# Patient Record
Sex: MALE | Race: WHITE | NOT HISPANIC OR LATINO | Employment: UNEMPLOYED | URBAN - METROPOLITAN AREA
[De-identification: names, ages, dates, MRNs, and addresses within clinical notes are randomized per-mention and may not be internally consistent; named-entity substitution may affect disease eponyms.]

---
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
## 2021-01-05 ENCOUNTER — TELEPHONE (OUTPATIENT)
Dept: OPHTHALMOLOGY | Facility: CLINIC | Age: 21
End: 2021-01-05

## 2021-01-05 NOTE — TELEPHONE ENCOUNTER
left voice mail, genetics counselor would like to see them at either 11:30 or 3:15.  Left my office number to return my call.  Osmin Arvizu

## 2021-01-11 ENCOUNTER — OFFICE VISIT (OUTPATIENT)
Dept: OPHTHALMOLOGY | Facility: CLINIC | Age: 21
End: 2021-01-11
Attending: OPHTHALMOLOGY
Payer: COMMERCIAL

## 2021-01-11 DIAGNOSIS — L56.8 PHOTOSENSITIVITY: Primary | ICD-10-CM

## 2021-01-11 DIAGNOSIS — H21.563 PUPILLARY ABNORMALITY OF BOTH EYES: ICD-10-CM

## 2021-01-11 DIAGNOSIS — Q14.1 CONGENITAL HYPOPLASIA OF FOVEA CENTRALIS: ICD-10-CM

## 2021-01-11 DIAGNOSIS — H53.50: ICD-10-CM

## 2021-01-11 DIAGNOSIS — H53.51 ACHROMATOPSIA, CONGENITAL: ICD-10-CM

## 2021-01-11 DIAGNOSIS — H35.53 CONE DYSTROPHY: Primary | ICD-10-CM

## 2021-01-11 DIAGNOSIS — H35.53 CONE DYSTROPHY: ICD-10-CM

## 2021-01-11 DIAGNOSIS — H55.01 CONGENITAL NYSTAGMUS: ICD-10-CM

## 2021-01-11 DIAGNOSIS — Z71.83 ENCOUNTER FOR NONPROCREATIVE GENETIC COUNSELING: ICD-10-CM

## 2021-01-11 DIAGNOSIS — L56.8 PHOTOSENSITIVITY: ICD-10-CM

## 2021-01-11 PROCEDURE — 92250 FUNDUS PHOTOGRAPHY W/I&R: CPT | Performed by: OPHTHALMOLOGY

## 2021-01-11 PROCEDURE — 99214 OFFICE O/P EST MOD 30 MIN: CPT | Performed by: OPHTHALMOLOGY

## 2021-01-11 PROCEDURE — 96040 HC GENETIC COUNSELING, EACH 30 MINUTES: CPT | Performed by: GENETIC COUNSELOR, MS

## 2021-01-11 PROCEDURE — 999N001104 HC STATISTIC DNA ISOL HIGH PURITY: Performed by: OPHTHALMOLOGY

## 2021-01-11 PROCEDURE — 92134 CPTRZ OPH DX IMG PST SGM RTA: CPT | Performed by: OPHTHALMOLOGY

## 2021-01-11 PROCEDURE — 99207 FUNDUS AUTOFLUORESCENCE IMAGE (FAF) OU (BOTH EYES): CPT | Mod: 26 | Performed by: OPHTHALMOLOGY

## 2021-01-11 PROCEDURE — G0463 HOSPITAL OUTPT CLINIC VISIT: HCPCS

## 2021-01-11 PROCEDURE — 92015 DETERMINE REFRACTIVE STATE: CPT

## 2021-01-11 RX ORDER — EPINEPHRINE 0.3 MG/.3ML
0.3 INJECTION SUBCUTANEOUS
COMMUNITY
Start: 2020-02-06

## 2021-01-11 ASSESSMENT — REFRACTION_WEARINGRX
OD_ADD: +5.00
OD_CYLINDER: SPHERE
OS_AXIS: 073
SPECS_TYPE: BIFOCAL
OD_SPHERE: +1.50
OS_ADD: +5.00
OS_CYLINDER: +2.50
OS_SPHERE: +2.25

## 2021-01-11 ASSESSMENT — CUP TO DISC RATIO
OD_RATIO: 0.3
OS_RATIO: 0.6

## 2021-01-11 ASSESSMENT — VISUAL ACUITY
CORRECTION_TYPE: GLASSES
OS_CC: 20/150
METHOD: SNELLEN - LINEAR
OD_CC: 20/100

## 2021-01-11 ASSESSMENT — TONOMETRY
OD_IOP_MMHG: 10
IOP_METHOD: TONOPEN
OS_IOP_MMHG: 14

## 2021-01-11 ASSESSMENT — REFRACTION_MANIFEST
OD_ADD: +5.00
OS_ADD: +5.00
OS_SPHERE: +2.25
OD_CYLINDER: SPHERE
OS_CYLINDER: +2.50
OD_SPHERE: +1.25
OS_AXIS: 083

## 2021-01-11 ASSESSMENT — CONF VISUAL FIELD
OS_NORMAL: 1
METHOD: COUNTING FINGERS
OD_NORMAL: 1

## 2021-01-11 ASSESSMENT — SLIT LAMP EXAM - LIDS
COMMENTS: NORMAL
COMMENTS: NORMAL

## 2021-01-11 NOTE — PROGRESS NOTES
CC: retina dystrophy consult  HPI: Stanislav Calderon is a  20 year old year-old patient referred by Dr. Enciso to rule out cone dystrophy vs achromatopsia.  Patient has history of light sensitivity/photophobia.  Negative for glare and eye pain.  Treatments tried include glasses.  Reports Vision stable with prescription for the past several yrs. Sits near front of classroom, has lights dimmed, teacher uses specific colors on white board and says what he is writing. Photophobia controlled with transition lenses.  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 Associated with reduced vision, photosensitivity, abnormal color vision, nystagmus, and paradoxical pupil.  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   Characteristic of retinal dystrophy- achromatopsia    Congenital nystagmus - Both Eyes  Fine amplitude. Related to cone dystrophy.    Monocular esotropia - Left Eye  Small angle esotropia with great cosmesis. Is s/p Arian with Dr. Faye for anomalous head posture.  - Monitor by Dr. Enciso.    Low vision, both eyes  R:  20/80+1; L:20/125+1 best corrected visual acuity   - Continue with Transitions lenses/cap.    Abnormally colored vision - Both Eyes  Ishihara test could only identified the control plate.   Related to cone dystrophy.     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 Will monitor.    Hyperopia right eye   Hyperopic astigmatism left eye   - Updated glasses  prescription provided last eye examination. Likes high add for near.  - happy with the current prescription     Plan:  - will follow up genetic testing  - recommend follow up retina evaluation with FFERG and goldmann visual field (GVF) and macula Optical Coherence Tomography in 1 yr to assess for progression  Sooner as needed     ~~~~~~~~~~~~~~~~~~~~~~~~~~~~~~~~~~   Complete documentation of historical and exam elements from today's encounter can be found in the full encounter summary report (not reduplicated in this progress note).  I personally obtained the chief complaint(s) and history of present illness.  I confirmed and edited as necessary the review of systems, past medical/surgical history, family history, social history, and examination findings as documented by others; and I examined the patient myself.  I personally reviewed the relevant tests, images, and reports as documented above.  I formulated and edited as necessary the assessment and plan and discussed the findings and management plan with the patient and family    Molly Bran MD   of Ophthalmology.  Retina Service   Department of Ophthalmology and Visual Neurosciences   [facility]  Phone: [phone]   Fax: [phone]

## 2021-01-11 NOTE — LETTER
1/11/2021       RE: Stanislav Calderon  [address]  [place] [address]     Dear Colleague,    Thank you for referring your patient, Stanislav Calderon, to the [facility] at [facility]. Please see a copy of my visit note below.       CC: retina dystrophy consult  HPI: Stanislav Calderon is a  20 year old year-old patient referred by Dr. Enciso to rule out cone dystrophy vs achromatopsia.  Patient has history of light sensitivity/photophobia.  Negative for glare and eye pain.  Treatments tried include glasses.  Reports Vision stable with prescription for the past several yrs. Sits near front of classroom, has lights dimmed, teacher uses specific colors on white board and says what he is writing. Photophobia controlled with transition lenses.  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 Associated with reduced vision, photosensitivity, abnormal color vision, nystagmus, and paradoxical pupil.  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   Characteristic of retinal dystrophy- achromatopsia    Congenital nystagmus - Both Eyes  Fine amplitude. Related to cone dystrophy.    Monocular esotropia - Left Eye  Small angle esotropia with great cosmesis. Is s/p Arian with Dr. Faye for anomalous head posture.  - Monitor by Dr. Enciso.    Low vision, both eyes  R:  20/80+1; L:20/125+1 best corrected visual acuity   - Continue with Transitions  lenses/cap.    Abnormally colored vision - Both Eyes  Ishihara test could only identified the control plate.   Related to cone dystrophy.     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 Will monitor.    Hyperopia right eye   Hyperopic astigmatism left eye   - Updated glasses prescription provided last eye examination. Likes high add for near.  - happy with the current prescription     Plan:  - will follow up genetic testing  - recommend follow up retina evaluation with FFERG and goldmann visual field (GVF) and macula Optical Coherence Tomography in 1 yr to assess for progression  Sooner as needed     ~~~~~~~~~~~~~~~~~~~~~~~~~~~~~~~~~~   Complete documentation of historical and exam elements from today's encounter can be found in the full encounter summary report (not reduplicated in this progress note).  I personally obtained the chief complaint(s) and history of present illness.  I confirmed and edited as necessary the review of systems, past medical/surgical history, family history, social history, and examination findings as documented by others; and I examined the patient myself.  I personally reviewed the relevant tests, images, and reports as documented above.  I formulated and edited as necessary the assessment and plan and discussed the findings and management plan with the patient and family. Molly Bran MD    Again, thank you for allowing me to participate in the care of your patient.      Sincerely,  Molly Bran M.D.   of Ophthalmology  Vitreoretinal Surgeon  [facility] Endowed Chair  Department of Ophthalmology & Visual Neurosciences  [facility]  Phone:  [phone]   Fax:  [phone]

## 2021-01-11 NOTE — NURSING NOTE
Chief Complaint(s) and History of Present Illness(es)     Cone Shyam Dystrophy Follow Up     In both eyes.  Associated symptoms include Negative for dryness, eye pain, redness and tearing.  Pain was noted as 0/10.              Comments     Pt is here for his yearly eye exam. Pt notes vision has been about the same x the last year.     Ocular meds: none    CHRISSY Hannah 12:49 PM January 11, 2021

## 2021-01-12 LAB — COPATH REPORT: NORMAL

## 2021-01-14 NOTE — PROGRESS NOTES
"[facility] Genetic Counseling Note     Date of Visit: 01/11/21     Presenting Information: Stanislav Calderon is a 20 year old year old male who was seen along with with his mother and brother for genetic counseling at the request of Dr. Bran, because Stanislav's previous eye exam findings were suggestive a diagnosis of possible cone dystrophy or achromatopsia.  Genetic counseling was requested to obtain family history, to discuss the genetic details of retinal disorders, and to coordinate genetic testing.       Medical History:   Stanislav has a history of photophobia, congenital nystagmus, foveal hypoplasia, paradoxical pupil, and abnormal color vision described as \"muted.\" Stanislav's identical twin brother, Chicho, has the same presentation. Both boys were previously followed by Dr. Davis. At one point there was a question of oculocutaneous albinism due to their eye findings and light pigmented skin and hair. They were most recently seen by Dr. Enciso in 11/2019 with the differential diagnoses being cone dystrophy vs achromatopsia. Genetic testing has been a consideration in the past but has not been pursued.     Stanislav reports no other health concerns. Please refer to Dr. Bran's not from today for further detail of today's exam findings.     Birth History:  Stanislav and his twin brother were born 5 weeks early. The pregnancy was complicated by twin-twin transfusion.      Family History:  A three generation pedigree was obtained and scanned into the electronic medical record. The relevant portions are described below:       Siblings-     Identical twin brother, Chicho, who has the same eye condition.     17 year old brother who is healthy    15 year old brother who is healthy    Parents- Rancho's mother is 48 years old and is healthy. His father is 48 years old and is healthy.     Maternal Relatives- Stanislav has one maternal aunt who is healthy and has a healthy 22 year old daughter. Stanislav has one maternal " aunt who is healthy and has a healthy son and a daughter with diabetes, presumably type 1. Stanislav has one maternal uncle who is healthy and has a healthy son and daughter. Stanislav's maternal grandmother is 75 years old and is reported to have kidney problems attributed to having scarlet fever as a child. She has 13 siblings who have no reported vision concerns. Stanislav's maternal grandfather is 75 years old and had glaucoma diagnosed at age 60.     Paternal Relatives- Stanislav has one paternal aunt who has Sjogren's and she has one son who is healthy and one daughter who has a learning disability. Stanislav has one paternal aunt who is healthy and has three children, two daughters and one son. One of her daughters is 10 and is reported to have anxiety and other mental health concerns. Stanislav's paternal grandmother is alive and well. His paternal grandfather had skin cancer on his face and is otherwise healthy.      Family history is otherwise largely non-contributory. Maternal ancestry is [place] and paternal ancestry is [place]. Consanguinity was denied.      Genetic Counseling Discussion:  We reviewed that our bodies are made of cells that contain our chromosomes which are made up of long stretches of DNA containing our genes. Our genes serve as the instructions for our bodies to grow and function. We have two copies of each gene, one inherited from our mother and one inherited from our father.     Retinal dystrophy is a broad category of eye conditions that are all associated with breakdown or degeneration of the retina.  Depending on the specific condition, this degeneration can affect the various different cells types of the retina, and therefore, the specific symptoms can vary significantly from one retinal dystrophy condition to another.  Most commonly affected areas of the retina include the photoreceptors (rods and cones) or the retinal pigment epithelium (RPE). Cone dystrophy is a specific type of retinal dystrophy  that affects the cones of the retina. The cones allow us to see color and fine detail so individuals with cone dystrophies usually have decreased visual acuity, a reduced ability to see colors, and an increased sensitivity to light (photophobia). The age of onset of symptoms, severity of symptoms, and progression of symptoms vary between individuals with cone dystrophies and can vary even between members of the same family with the same type of cone dystrophy. To date, mutations, or changes, in many different genes have been found to cause cone dystrophy.     Achromatopsia is caused by problems with the cones's ability to react appropriately to light. It is characterized by partial or total absence of color vision. Individuals with complete achromatopsia cannot perceive any colors and see only black, white, and shades of gray. Incomplete achromatopsia is a milder form of the condition that allows some color discrimination. Individuals with achromatopsia can develop other eye symptoms in the first few months of life such as photophobia, nystagmus, low visual acuity, and near- or farsightedness. Mutations in one of several genes (ATF6, CNGA3, CNGB3, GNAT2, PDE6C, or PDE6H) are known to cause achromatopsia.    Cone dystrophies and achromatopsia can be inherited in a variety of different inheritance patterns depending on which gene is associated with the condition. They can be inherited in an autosomal recessive inheritance pattern, meaning both copies of the gene have a mutation causing the disorder. Cone dystrophy can also be inherited in an X-linked inheritance pattern meaning a mutation in a gene on the X chromosome causes the condition. Typically males are more severely affected with X-linked conditions, but carrier women can have symptoms in some X-linked disorders. Cone dystrophy can also be inherited in an autosomal dominant inheritance pattern, meaning a mutation in one copy of the gene is sufficient to cause  the condition.The absence of a family history cannot exclude dominant inheritance due to the frequency of new (de derik) mutations and variable penetrance.    We reviewed the availability of genetic testing via Next Generation Sequencing (NGS) for analysis of genes known to be associated with cone dystrophy, achromatopsia, and oculocutaneous albinism, with the aim of determining what condition is causing Stanislav and his twin brother's eye symptoms. This custom panel will encompass the genes for all of the differential diagnoses Cyrus have had. NGS allows a genetic testing laboratory to rapidly sequence DNA so that the testing laboratory can read, chemical base by chemical base, through a gene looking for changes in the instructions of the genes that might alter the function of that gene or the protein made from that gene. NGS has the benefit of allowing for a panel-based approach of sequencing multiple genes at once, rather than sequencing each gene individually in a stepwise fashion; this conserves both cost and time.     We went on to discuss the details, limitations, and possible outcomes of NGS. In particular, we discussed that there are three possible results from NGS:    Negative: meaning normal or no mutations are identified in the genes that were tested/sequenced    Positive: meaning a mutation that is known to be associated with a particular set of symptoms is identified    Variant of uncertain significance (VUS): meaning a change in the DNA sequence of a particular gene was seen but there is not enough information or data yet to know if it explains the symptoms. If a VUS is identified, testing of other relatives may be helpful to provide clarification.  In most cases, identification of a VUS does not confirm a diagnosis and does not result in any clinically actionable recommendations.    We discussed the potential benefits of genetic testing and why this genetic testing is medically indicated. A  positive result will help determine the etiology of the ocular abnormalities noted in Cyrus and may guide the medical management for them, particularly if a syndromic cause of these ocular symptoms is found.  Additionally, for many of these genes, there is information available about expected prognosis or new treatment options.  Also, if a genetic cause is found for Cyrus's ocular abnormalities, it will give us a more accurate risk assessment for other family members, particularly Cyrus's future children.     We also discussed the possibility that this test could turn up no definitive answers about the underlying cause of the boys' ocular abnormalties.  We talked about that a negative genetic test would not rule out a genetic cause for thier ocular abnormalities, as it is likely that not all the genes associated with inherited retinal disorders are currently known.       We discussed the most cost and time efficient method of testing is to start with testing for one affected family member, in this case either Stanislav or Chicho. If testing is positive, we can then test the other brother for the specific familial variant. Stanislav elected to undergo genetic testing today.     Next Generation Sequencing is a well established technology utilized by all molecular genetic labs throughout the country for identifying disease-causing mutations in various genes.  Next Generation Sequencing is currently the standard of care for genetic testing of single genes.  The recommended testing for Stanislav  is DIAGNOSTIC testing, and it is NOT investigational.    Stanislav consented for genetic testing today. We will submit information for prior authorization and Stanislav will be contacted regarding the authorization status and potential cost of testing. If we proceed with testing, I will call with the results in 4-6 weeks.     It was a pleasure meeting Chicho [place], and their mother today. They were  encouraged to reach out to me if they have any further questions.     Plan:  1. Custom panel at [facility] for genes associated with cone-samantha dystrophy, achromatopsia, and oculocutaneous albinism  2. Information will be submitted for insurance prior authorization. Stanislav will be contacted with the outcome in 1-2 week. If he proceeds with testing, I will call him with results in 4-6 weeks.     Lita Block MS, [facility]  Licensed Genetic Counselor  [facility]  Phone: [phone]  Fax: [phone]    Time spent in consultation face to face was approximately 45 minutes.

## 2021-02-19 ENCOUNTER — TELEPHONE (OUTPATIENT)
Dept: CONSULT | Facility: CLINIC | Age: 21
End: 2021-02-19

## 2021-02-19 NOTE — TELEPHONE ENCOUNTER
Notified Trey, patient's father, that we received prior authorization approval for Stanislav's genetic testing. Valid from 1/11/2021 to 5/31/2021. Authorization number is [id number].     Explained that insurance benefits may still apply, therefore, there could be an out of pocket cost.     Trey expressed understanding and stated that he wants Stanislav to proceed with testing. We will call when results are available.  Trey had no further questions.    Bee Cruz, GOMEZ  Genomics Billing    [facility]   Molecular Diagnostics Laboratory  [address] [place] [id number]  [phone]

## 2021-02-22 DIAGNOSIS — H53.50: ICD-10-CM

## 2021-02-22 DIAGNOSIS — L56.8 PHOTOSENSITIVITY: Primary | ICD-10-CM

## 2021-02-22 DIAGNOSIS — H21.563 PUPILLARY ABNORMALITY OF BOTH EYES: ICD-10-CM

## 2021-02-22 DIAGNOSIS — H35.53 CONE DYSTROPHY: ICD-10-CM

## 2021-02-22 DIAGNOSIS — H53.51 ACHROMATOPSIA, CONGENITAL: ICD-10-CM

## 2021-02-22 PROCEDURE — 81404 MOPATH PROCEDURE LEVEL 5: CPT | Performed by: OPHTHALMOLOGY

## 2021-02-22 PROCEDURE — 81408 MOPATH PROCEDURE LEVEL 9: CPT | Performed by: OPHTHALMOLOGY

## 2021-02-22 PROCEDURE — G0452 MOLECULAR PATHOLOGY INTERPR: HCPCS | Mod: 26 | Performed by: PATHOLOGY

## 2021-02-22 PROCEDURE — 81479 UNLISTED MOLECULAR PATHOLOGY: CPT | Performed by: OPHTHALMOLOGY

## 2021-03-26 LAB — COPATH REPORT: NORMAL

## 2021-04-01 ENCOUNTER — TELEPHONE (OUTPATIENT)
Dept: OPHTHALMOLOGY | Facility: CLINIC | Age: 21
End: 2021-04-01

## 2021-04-01 NOTE — TELEPHONE ENCOUNTER
I called Stanislav and left him a voicemail asking him to call me back to review his genetic testing results.     Lita Block MS, [facility]  Licensed Genetic Counselor  [facility]  Phone: [phone]  Fax: [phone]

## 2021-07-09 ENCOUNTER — TRANSFERRED RECORDS (OUTPATIENT)
Dept: HEALTH INFORMATION MANAGEMENT | Facility: CLINIC | Age: 21
End: 2021-07-09
Payer: COMMERCIAL

## 2021-12-15 ENCOUNTER — TELEPHONE (OUTPATIENT)
Dept: OPHTHALMOLOGY | Facility: CLINIC | Age: 21
End: 2021-12-15
Payer: COMMERCIAL

## 2021-12-15 NOTE — TELEPHONE ENCOUNTER
12/15/2021 9:12AM Angela returned my call to the [facility] Call Center regarding Stanislav's sibling and was transferred to me. She states that Stanislav is an identical twin and she believes the same testing is needed for both patients. Since there is no ERG Referral for Stanislav, I will [facility] and [facility] back to schedule.

## 2021-12-17 ENCOUNTER — MEDICAL CORRESPONDENCE (OUTPATIENT)
Dept: HEALTH INFORMATION MANAGEMENT | Facility: CLINIC | Age: 21
End: 2021-12-17
Payer: COMMERCIAL

## 2021-12-22 ENCOUNTER — MEDICAL CORRESPONDENCE (OUTPATIENT)
Dept: HEALTH INFORMATION MANAGEMENT | Facility: CLINIC | Age: 21
End: 2021-12-22
Payer: COMMERCIAL

## 2022-01-13 DIAGNOSIS — H35.53 CONE DYSTROPHY: Primary | ICD-10-CM

## 2022-01-17 ENCOUNTER — OFFICE VISIT (OUTPATIENT)
Dept: OPHTHALMOLOGY | Facility: CLINIC | Age: 22
End: 2022-01-17
Attending: OPHTHALMOLOGY
Payer: COMMERCIAL

## 2022-01-17 DIAGNOSIS — H53.51: Primary | ICD-10-CM

## 2022-01-17 DIAGNOSIS — H35.53 CONE DYSTROPHY: ICD-10-CM

## 2022-01-17 PROCEDURE — 92273 FULL FIELD ERG W/I&R: CPT

## 2022-01-17 PROCEDURE — G0463 HOSPITAL OUTPT CLINIC VISIT: HCPCS | Mod: 25

## 2022-01-17 PROCEDURE — 92012 INTRM OPH EXAM EST PATIENT: CPT | Performed by: OPHTHALMOLOGY

## 2022-01-17 PROCEDURE — 999N000103 HC STATISTIC NO CHARGE FACILITY FEE

## 2022-01-17 PROCEDURE — 92083 EXTENDED VISUAL FIELD XM: CPT

## 2022-01-17 ASSESSMENT — VISUAL ACUITY
METHOD: SNELLEN - LINEAR
OD_CC: 20/80
OS_CC+: -1
OS_CC+: -1
METHOD: SNELLEN - LINEAR
OS_CC: 20/125
CORRECTION_TYPE: GLASSES
OD_CC: 20/80
CORRECTION_TYPE: GLASSES
OS_CC: 20/125

## 2022-01-17 ASSESSMENT — REFRACTION_WEARINGRX
OS_AXIS: 073
OD_CYLINDER: SPHERE
OS_AXIS: 073
OD_ADD: +5.00
OD_SPHERE: +1.50
SPECS_TYPE: BIFOCAL
OS_CYLINDER: +2.50
OD_SPHERE: +1.50
OS_ADD: +5.00
OD_ADD: +5.00
SPECS_TYPE: BIFOCAL
OS_SPHERE: +2.25
OS_ADD: +5.00
OS_SPHERE: +2.25
OS_CYLINDER: +2.50

## 2022-01-17 ASSESSMENT — TONOMETRY
OD_IOP_MMHG: 11
OS_IOP_MMHG: 12
IOP_METHOD: ICARE

## 2022-01-17 ASSESSMENT — CONF VISUAL FIELD
OS_SUPERIOR_NASAL_RESTRICTION: 3
OD_NORMAL: 1

## 2022-01-17 ASSESSMENT — SLIT LAMP EXAM - LIDS
COMMENTS: NORMAL
COMMENTS: NORMAL

## 2022-01-17 ASSESSMENT — CUP TO DISC RATIO
OD_RATIO: 0.3
OS_RATIO: 0.6

## 2022-01-17 NOTE — NURSING NOTE
Chief Complaints and History of Present Illnesses   Patient presents with     Retinal Dystrophy Hereditary Follow Up     1 week follow up both eyes.     Chief Complaint(s) and History of Present Illness(es)     Retinal Dystrophy Hereditary Follow Up     Comments: 1 week follow up both eyes.              Comments     Pt states vision is the same as last year. No eye pain today. No flashes or floaters.  No redness or dryness.    CHRISSY Maier January 17, 2022 8:26 AM

## 2022-01-17 NOTE — PROGRESS NOTES
CC: retina dystrophy follow up   HPI: Stanislav Calderon is a  21 year old year-old patient initially referred by Dr. Enciso to rule out cone dystrophy vs achromatopsia.  Patient has history of light sensitivity/photophobia.  Negative for glare and eye pain.  Treatments tried include glasses.  Reports Vision stable with prescription for the past several yrs. Sits near front of classroom, has lights dimmed, teacher uses specific colors on white board and says what he is writing. Photophobia controlled with transition lenses.  FH: twin brother with similar eye problems    Retinal Imaging:  OCT   RE: slightly decreased foval contour and stippling of the IS/OS Junction  LE:  slightly decreased foval contour and stippling of the IS/OS Junction    Optos pic consistent with exam  Autofluorescence:  Central foveal hyperautofluorescence; no bull's eye maculopathy         Assessment & Plan    Stanislav Calderon is a 21 year old male who presents with:     #Achromatopsia.   - invitae: Two heterozygous pathogenic variants were detected in the CNGB3 gene.   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  - paradoxical pupillary with constriction in dark  - To obtained goldmann visual field  And ffERG today  - recommend observation  - no gene therapy available yet. Patient can get additional information at clinicaltrials.gov    # Monocular esotropia - Left Eye  s/p Arian with Dr. Faye for anomalous head posture.  Small angle esotropia with great cosmesis.   - Monitor by Dr. Enciso.    # Low vision, both eyes  R:  20/80+1; L:20/125+1 best corrected visual acuity   - Continue with Transitions lenses/cap.    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 Will monitor.    #Hyperopic astigmatism left eye   - Updated glasses prescription provided last  eye examination. Likes high add for near.  - happy with the current prescription     Plan:  - recommend  FFERG and goldmann visual field (GVF) and macula Optical Coherence Tomography 01/17/22   - follow up in one yr with optos and Optical Coherence Tomography     ~~~~~~~~~~~~~~~~~~~~~~~~~~~~~~~~~~   Complete documentation of historical and exam elements from today's encounter can be found in the full encounter summary report (not reduplicated in this progress note).  I personally obtained the chief complaint(s) and history of present illness.  I confirmed and edited as necessary the review of systems, past medical/surgical history, family history, social history, and examination findings as documented by others; and I examined the patient myself.  I personally reviewed the relevant tests, images, and reports as documented above.  I formulated and edited as necessary the assessment and plan and discussed the findings and management plan with the patient and family    Molly Bran MD   of Ophthalmology.  Retina Service   Department of Ophthalmology and Visual Neurosciences   [facility]  Phone: [phone]   Fax: [phone]

## 2022-01-17 NOTE — LETTER
STANDARD ERG REPORT    RE: Stanislav Calderon  MRN: [id number]  : 2000     ERG Date:  2022    Referring: Molly Bran MD    CLINICAL HISTORY:  This is a 21-year-old patient with possible achromatopsia associated with reduced vision, photosensitivity, abnormal color vision, nystagmus and paradoxical pupil.  +Twin brother with similar findings.  S/P Kestenbaum/Shuey with great cosmesis.  Mild congenital ptosis, bilateral.      IMPRESSION:  1. Achromatopsia     2. Central scotomas    Visual Acuity with glasses: Right Eye: 20/80, PHNI        +1.50sph        Left Eye: 20/125-1, PHNI        +2.25 +2.50 x 073                                                ALL AVERAGED      Data for Full-Field ERG  Right Eye  Left Eye   DARK-ADAPTED Patient Normal Patient   0.01 ERG (samantha) b-wave amplitude ( v) 156 45.5 to 422.3 255   0.01 ERG (samantha) b-wave implicit time (ms) 76.5 75.4 to 113 83.2         3.0 ERG (combined) a-wave amplitude ( v) -143 -288.6 to -102.2 -216   3.0 ERG (combined) a-wave implicit time (ms) 14.4 10.2 to 18.2 14.4   3.0 ERG (combined) b-wave amplitude ( v) 297 186.6 to 481.8 397   3.0 ERG (combined) b-wave implicit time (ms) 58.8 35.2 to 56.6 59.9         10.0 ERG (brighter combined) a-wave amplitude ( v) -200 -322.3 to -99.5 -280   10.0 ERG (brighter combined) a-wave implicit time (ms) 11.1 10.6 to 13.4 11.1   10.0 ERG (brighter combined) b-wave amplitude ( v) 338 199.1 to 483.3 380   10.0 ERG (brighter combined) b-wave implicit time (ms) 58.2 33.7 to 53.5 63.2         3.0 Oscillatory Potentials Present  Present  Present    LIGHT-ADAPTED       3.0 Flicker (30Hz) amplitude ( v) 4 57.8 to 177.2 7   3.0 Flicker (30Hz) implicit time (ms) 21.6 20.9 to 30.5 25         3.0 ERG (cone) a-wave amplitude ( v) ---- -63.1 to -10.1 ----   3.0 ERG (cone) a-wave implicit time (ms) ---- 10.7 to 17.1 ----   3.0 ERG (cone) b-wave amplitude ( v) ---- 64.3 to 196.9 ----   3.0 ERG (cone) b-wave implicit time (ms) ----  25 to 33.4 ----   * = manipulated cursors  parentheses = cursors at selected peaks  ---- = residual to non-measurable  xxxx = not tested      Tech notes: ffERG discussed and performed with E3 system.  Manual GVF done just prior, both eyes undilated.  Equally well-dilated for ffERG ~10mm.  Tolerated dtl nicely.  Equal and adequate eye opening w/great effort to clear dilated pupils.  Impedances low and comparable throughout.  No difficulty with blinking.  Specifically, no eyelid flutter to bright flashes DA and LA.   However, significant flutter and rollback with flicker but without complete closure/blinks.     INTERPRETATION:   This full-field electroretinogram was performed according to ISCEV standards using the ESPION E3 system and DTL fiber-recording electrodes. The patient tolerated the testing well. The waveforms are fairly reproducible and well formed. The normative values provided above represent the 95% confidence limits for a normal individual the age of the patient. The patient s responses are averaged.  There is mild asymmetry of the responses in between both eyes.    In dark-adapted conditions, the samantha-specific responses have normal amplitudes and the implicit times are normal.  The maximal response, a combined samantha and cone response, has  normal amplitudes in both eyes and the implicit time is delayed for the b-wave. The bright flash (scotopic 10.0) response is not electronegative. There were normal amplitudes, but delayed b-wave implicit time. The oscillatory potentials are present bilaterally.      In light-adapted conditions, the 30-Hz flicker responses have significantly reduced amplitudes and the implicit time is normal in both eyes.  The single photopic responses are residual to nonmeasurable in both eyes. The on and off responses were attenuated and there was not enhanced S-cone response. RETeval also showed cone dysfunction.    CONCLUSION:  This represents an abnormal electroretinogram consistent  with the diagnosis of achromatopsia. This electroretinogram shows relatively intact samantha system and deficient cone system.     Clinical correlation is recommended.    A repeat electroretinogram could be considered in 2 years, or earlier if the clinical situation changes.          STANDARD RETeval ERG REPORT,  ISCEV Photopic Flash/Flicker and PhNR   --RETeval w/Sensor Strip = 1/3 Espion3 w/DTL                                                     ALL AVERAGED  Data for Full-Field ERG Right Eye   Left Eye    Dark-Adapted Patient Normal  Patient   0.01 ERG (samantha) amplitude( v) xxxx 19 to 63 xxxx   0.01 ERG (samantha) implicit time(ms) xxxx 71 to 96 xxxx   MMMMM      3.0 ERG (combined) a-wave amplitude( v) xxxx -56 to -21 xxxx   3.0 ERG (combined) a-wave implicit time(ms) xxxx 12 to 19 xxxx   3.0 ERG (combined) b-wave amplitude( v) xxxx 35 to 104 xxxx   3.0 ERG (combined) b-wave implicit time(ms) xxxx 40 to 59 xxxx   MMMMM      10.0 ERG (brighter) a-wave amplitude( v) xxxx -69 to -24 xxxx   10.0 ERG (brighter) a-wave implicit time(ms) xxxx 10 to 13 xxxx   10.0 ERG (brighter) b-wave amplitude( v) xxxx 36 to 105 xxxx   10.0 ERG (brighter) b-wave implicit time(ms) xxxx 41 to 62 xxxx         3.0 Oscillatory Potentials xxxx present xxxx    Light-Adapted      3.0 Flicker (30-Hz) amplitude( v) 1.9 19.4 to 56.4 4.6   3.0 Flicker (30-Hz) implicit time(ms) ---- 23.4 to 28.3 ----         3.0 ERG (cone) a-wave amplitude( v) ---- -16.7 to -2.8 -1.2   3.0 ERG (cone) a-wave implicit time(ms) ---- 9.9 to 14.1 13.0   3.0 ERG (cone) b-wave amplitude( v) ---- 20.2 to 67.8 6.8   3.0 ERG (cone) b-wave implicit time(ms) ---- 25.6 to 30.9 38.6     ---- = residual to non-measurable        xxxx = not tested            Normative values per  Evaluation of light- and dark-adapted ERGs using a mydriasis-free,                           portable system: clinical classifications and normative data  by H Yadiel, X Mp, S Garcia, SN                                   TERE Zimmerman, YANNI Benson ,LUZ Berry, FERNY Danielle,   Ophthalmology and Vision                         Sciences, [facility], [facility].                     [email or web address]                        Normative values per [facility] data per individual age at time of testing, cd (dilated) and Td                                      (undilated).             STANDARD GVF REPORT               GVF Date:  1/17/2022    IMPRESSION:   1. Abnormal Goldmann visual field (GVF)   2. Mild constriction of the visual field, both eyes   3. Small central scotomas, both eyes     Tech notes: Manual Goldmann visual field discussed and performed both eyes undilated.  Monitored and exhibited good fix both eyes though right eye with larger eye opening and with less nystagmus than left eye.  Both eyes with early lid fatigue and full lashes.  Did not feel that nystagmus was an issue.  Both Eyes:  Did not visualize I2e.  Good static check in central 30 degrees with I3e, I4e and II4e.  Difficulty plotting blindspot.  Good periphery check.    INTERPRETATION:     Right Eye:   Fixation: Good  Blind spot: Did not visualize I2e. Good static check in central 30 degrees with I3e, I4e and II4e.  Difficulty plotting blindspot.    Findings: The peripheral isopters extended horizontally 100 degrees and vertically 100 degrees.  There are areas of decreased sensitivity temporal midperiphery. Did not visualize I2e centrally.  No other scotomas were identified.    Left Eye:  Fixation: Good  Blind spot:  Did not visualize I2e. Good static check in central 30 degrees with I3e, I4e and II4e.  Difficulty plotting blindspot.    Findings:  The peripheral isopters extended horizontally 105 degrees and vertically 100 degrees.  There are areas of decreased sensitivity sup paracentrally. Did not visualize I2e centrally.  No other scotomas were identified.      Thank you for the opportunity to provide  electrophysiologic services for this patient.  Please do not hesitate to call if there should be any questions regarding these results.      Molly Bran MD  .    Electrophysiology Service   Department of Ophthalmology & Visual Neurosciences   [facility]  Phone: [phone]   Fax: [phone]

## 2022-01-17 NOTE — LETTER
1/17/2022       RE: Stanislav Calderon  [address]  [place] [address]     Dear Colleague,    Thank you for referring your patient, Stanislav Calderon, to the [facility] at [facility]. Please see a copy of my visit note below.       CC: retina dystrophy follow up   HPI: Stanislav Calderon is a  21 year old year-old patient initially referred by Dr. Enciso to rule out cone dystrophy vs achromatopsia.  Patient has history of light sensitivity/photophobia.  Negative for glare and eye pain.  Treatments tried include glasses.  Reports Vision stable with prescription for the past several yrs. Sits near front of classroom, has lights dimmed, teacher uses specific colors on white board and says what he is writing. Photophobia controlled with transition lenses.  FH: twin brother with similar eye problems    Retinal Imaging:  OCT   RE: slightly decreased foval contour and stippling of the IS/OS Junction  LE:  slightly decreased foval contour and stippling of the IS/OS Junction    Optos pic consistent with exam  Autofluorescence:  Central foveal hyperautofluorescence; no bull's eye maculopathy         Assessment & Plan    Stanislav Calderon is a 21 year old male who presents with:     #Achromatopsia.   - invitae: Two heterozygous pathogenic variants were detected in the CNGB3 gene.   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  - paradoxical pupillary with constriction in dark  - To obtained goldmann visual field  And ffERG today  - recommend observation  - no gene therapy available yet. Patient can get additional information at clinicaltrials.gov    # Monocular esotropia - Left Eye  s/p Arian with Dr. Faye for anomalous head posture.  Small angle esotropia with great cosmesis.   - Monitor by  Dr. Enciso.    # Low vision, both eyes  R:  20/80+1; L:20/125+1 best corrected visual acuity   - Continue with Transitions lenses/cap.    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 Will monitor.    #Hyperopic astigmatism left eye   - Updated glasses prescription provided last eye examination. Likes high add for near.  - happy with the current prescription     Plan:  - recommend  FFERG and goldmann visual field (GVF) and macula Optical Coherence Tomography 01/17/22   - follow up in one yr with optos and Optical Coherence Tomography     ~~~~~~~~~~~~~~~~~~~~~~~~~~~~~~~~~~   Complete documentation of historical and exam elements from today's encounter can be found in the full encounter summary report (not reduplicated in this progress note).  I personally obtained the chief complaint(s) and history of present illness.  I confirmed and edited as necessary the review of systems, past medical/surgical history, family history, social history, and examination findings as documented by others; and I examined the patient myself.  I personally reviewed the relevant tests, images, and reports as documented above.  I formulated and edited as necessary the assessment and plan and discussed the findings and management plan with the patient and family. Molly Bran MD    Again, thank you for allowing me to participate in the care of your patient.      Sincerely,    Molly Bran M.D.   of Ophthalmology  Vitreoretinal Surgeon  [facility]  Department of Ophthalmology & Visual Neurosciences  [facility]  Phone:  [phone]   Fax:  [phone]

## 2022-01-17 NOTE — PROGRESS NOTES
2022    STANDARD GVF REPORT    RE: Stanislav Calderon  MRN: [id number]  : 2000                 GVF Date:  2022    CLINICAL HISTORY:  21 year old patient with possible achromatopsia associated with reduced vision, photosensitivity, abnormal color vision, nystagmus and paradoxical pupil.  +Twin brother with similar findings.  S/P Kestenbaum/Shuey with great cosmesis.  Mild congenital ptosis, bilateral.     IMPRESSION:  Achromatopsia     Central scotomas    Visual Acuity Right Eye : 20/80, PHNI      w/gls, +1.50sph    Visual Acuity Left Eye : 20/125-1, PHNI      w/gls, +2.25 + 2.50x073    Tech notes: Manual goldmann visual field discussed and performed both eyes undilated.  Monitored and exhibited good fix both eyes though right eye with larger eye opening and with less nystagmus than left eye.  Both eyes with early lid fatigue and full lashes.  Did not feel that nystagmus was an issue.  Both Eyes:  Did not visualize I2e.  Good static check in central 30 degrees with I3e, I4e and II4e.  Difficulty plotting blindspot.  Good periphery check.    IMPRESSION:   1. abnormal  goldmann visual field (GVF)   2. Mild constriction of the visual field both eyes   3. Small central scotomas both eyes     INTERPRETATION:     Right eye:   Fixation: good  Blind spot: did not visualize I2e. Good static check in central 30 degrees with I3e, I4e and II4e.  Difficulty plotting blindspot.    Findings: The peripheral isopters extended horizontally 100 degrees and vertically 100 degrees.  There are areas of decreased sensitivity temporal midperiphery. Did not visualize I2e centrally.  No other scotomas and were identified.    Left eye:  Fixation: good  Blind spot:  did not visualize I2e. Good static check in central 30 degrees with I3e, I4e and II4e.  Difficulty plotting blindspot.    Findings:  The peripheral isopters extended horizontally 105 degrees and vertically 100 degrees.  There are areas of decreased sensitivity sup  paracentrally. Did not visualize I2e centrally.  No other scotomas and were identified.    STANDARD ERG REPORT    ERG Date:  1/17/2022    IMPRESSION:                                                      ALL AVERAGED      Data for Full-Field ERG  Right Eye  Left Eye   DARK-ADAPTED Patient Normal Patient   0.01 ERG (samantha) b-wave amplitude ( v) 156 45.5 to 422.3 255   0.01 ERG (samantha) b-wave implicit time (ms) 76.5 75.4 to 113 83.2         3.0 ERG (combined) a-wave amplitude ( v) -143 -288.6 to -102.2 -216   3.0 ERG (combined) a-wave implicit time (ms) 14.4 10.2 to 18.2 14.4   3.0 ERG (combined) b-wave amplitude ( v) 297 186.6 to 481.8 397   3.0 ERG (combined) b-wave implicit time (ms) 58.8 35.2 to 56.6 59.9         10.0 ERG (brighter combined) a-wave amplitude ( v) -200 -322.3 to -99.5 -280   10.0 ERG (brighter combined) a-wave implicit time (ms) 11.1 10.6 to 13.4 11.1   10.0 ERG (brighter combined) b-wave amplitude ( v) 338 199.1 to 483.3 380   10.0 ERG (brighter combined) b-wave implicit time (ms) 58.2 33.7 to 53.5 63.2         3.0 Oscillatory Potentials  present    LIGHT-ADAPTED       3.0 Flicker (30Hz) amplitude ( v) 4 57.8 to 177.2 7   3.0 Flicker (30Hz) implicit time (ms) 21.6 20.9 to 30.5 25         3.0 ERG (cone) a-wave amplitude ( v) ---- -63.1 to -10.1 ----   3.0 ERG (cone) a-wave implicit time (ms) ---- 10.7 to 17.1 ----   3.0 ERG (cone) b-wave amplitude ( v) ---- 64.3 to 196.9 ----   3.0 ERG (cone) b-wave implicit time (ms) ---- 25 to 33.4 ----   * = manipulated cursors  parentheses = cursors at selected peaks  ---- = residual to non-measurable  xxxx = not tested      Tech notes: ffERG discussed and performed with E3 system.  Manual GVF done just prior, both eyes undilated.  Equally well-dilated for ffERG ~10mm.  Tolerated dtl nicely.  Equal and adequate eye opening w/great effort to clear dilated pupils.  Impedances low and comparable throughout.  No difficulty with blinking.  Specifically, no eyelid flutter  to bright flashes DA and LA.   However, significant flutter and rollback with flicker but without complete closure/blinks.     INTERPRETATION:     This full field electroretinogram was performed according to ISCEV standards using 8bit E3 system and DTL fiber recording electrodes. The patient tolerated the testing well.  The waveforms are fairly reproducible and well formed.  The normative values provided above represent the 95% confidence limits for a normal individual the age of the patient. The patient s responses are averaged.   There is mild asymmetry of the responses in between both eyes.  In dark adapted conditions,  the samantha-specific responses have normal amplitudes and the implicit times are normal.  The maximal response, a combined samantha and cone responses, have normal amplitudes in both eyes and the implicit time is delayed for the b-wave.  The bright flash (scotopic 10.0) response is not electronegative. There was normal amplitudes, but delayed b-wave implicit time  The oscillatory potentials are present bilaterally.      In light adapted conditions,  the 30-Hz flicker responses have significantreduced amplitudes and the implicit time is normal in both eyes.    The single photopic response are residual to non measurable in both eyes.  The on and off responses were attenuated and there was not enhanced S cone response. RETeval also showed cone dysfunction.    Conclusion:    This represents an abnormal electroretinogram consistent with the diagnosis of Achromatopsia. This electroretinogram  Shows relatively intact samantha system and deficient cone system.     Clinical correlation is recommended.     A  repeat electroretinogram could be considered in 2 years or earlier if the clinical situation changes.  Thank you for the opportunity to provide electrophysiologic services for this patient.  Please do not hesitate to call if there should be any questions regarding these results.      Molly Bran,  MD  .    Electrophysiology Service   Department of Ophthalmology & Visual Neurosciences   [facility]  Phone: [phone]   Fax: [phone]               STANDARD RETeval ERG REPORT,  ISCEV Photopic Flash/Flicker and PhNR   --RETeval w/Sensor Strip = 1/3 Espion3 w/DTL                                                     ALL AVERAGED  Data for Full-Field ERG Right Eye   Left Eye    Dark-Adapted Patient Normal  Patient   0.01 ERG (samantha) amplitude( v) xxxx 19 to 63 xxxx   0.01 ERG (samantha) implicit time(ms) xxxx 71 to 96 xxxx   MMMMM      3.0 ERG (combined) a-wave amplitude( v) xxxx -56 to -21 xxxx   3.0 ERG (combined) a-wave implicit time(ms) xxxx 12 to 19 xxxx   3.0 ERG (combined) b-wave amplitude( v) xxxx 35 to 104 xxxx   3.0 ERG (combined) b-wave implicit time(ms) xxxx 40 to 59 xxxx   MMMMM      10.0 ERG (brighter) a-wave amplitude( v) xxxx -69 to -24 xxxx   10.0 ERG (brighter) a-wave implicit time(ms) xxxx 10 to 13 xxxx   10.0 ERG (brighter) b-wave amplitude( v) xxxx 36 to 105 xxxx   10.0 ERG (brighter) b-wave implicit time(ms) xxxx 41 to 62 xxxx         3.0 Oscillatory Potentials xxxx present xxxx    Light-Adapted      3.0 Flicker (30-Hz) amplitude( v) 1.9 19.4 to 56.4 4.6   3.0 Flicker (30-Hz) implicit time(ms) ---- 23.4 to 28.3 ----         3.0 ERG (cone) a-wave amplitude( v) ---- -16.7 to -2.8 -1.2   3.0 ERG (cone) a-wave implicit time(ms) ---- 9.9 to 14.1 13.0   3.0 ERG (cone) b-wave amplitude( v) ---- 20.2 to 67.8 6.8   3.0 ERG (cone) b-wave implicit time(ms) ---- 25.6 to 30.9 38.6     ---- = residual to non-measurable        xxxx = not tested            Normative values per  Evaluation of light- and dark-adapted ERGs using a mydriasis-free,                              portable system: clinical classifications and normative data  by H Yadiel, X Mp, S Garcia, TERE Rodgers, LUZ Ritter, YANNI Beauchamp ,LUZ Berry, FERNY Danielle,   Ophthalmology and Vision  Sciences,                          [facility], [facility]. [email or web address]                           Normative values per [facility] data per individual age at time of testing, cd (dilated) and Td (undilated).           INTERPRETATION:                     Sincerely,

## 2022-07-28 NOTE — NURSING NOTE
MEDICAL WEIGHT LOSS INITIAL EVALUATION  DIAGNOSIS:  Obesity Class II    NUTRITION HISTORY:  Diet and exercise history per provider visit as follows:    Diet Recall      Wake Up: 6:30-7 am When new job starts.    Breakfast -   Lunch 1:00 pm Whatever available   Supper; 6-8 PM Dinner veggie, protein, and a starch   Bedtime:   Snack between meals:   Snack in evening:        Sometimes an ice cream, 6-8 cookies          Typical snacks: Soda- Pepsi,  Juice- simply lemonade, late night expresso   Caloric beverages per day. What type:     Water consumed daily:     Low dana/diet drinks:   Alcohol:   Foods you crave:        Minimal 1 every 2 years.    Peach Cobbler, cake         Sleep History     How many hours do you sleep at night? 11 PM on weeknights, 2 AM on weekends   Do you think you have sleep apnea?  No   Do you snore so loudly some one can hear you through a closed door? No   Has anyone seen or heard you stop breathing during your sleep?  No   Do you often feel tired, fatigued, or sleepy during the day? No          Eating Habits (Yes/No) (Never, Daily, Several Days, Weekly,Monthly)   Healthy Eater No      Generally, eat a lot of Simple Carbs/processed boxed foods No   Generally, eat a lot of fatty foods No   Eat fast food  Monthly      Eat Take out/sit-down restaurant. Monthly      Eat most meals in front of screens No      Skip meals Yes   Grazes all day No      Snacks between meals. No, liquid calories      Eat most food at end of day. Yes      Eat in middle of night  No-       Eat  to prevent or correct low blood sugar. No      Eat to prevent GERD No      Worry about not having enough food to eat. No      Constant Dieter No      I emotionally eat. (stressed, depressed, anxious, bored) Yes      I feel hungry all the time-even if I just have eaten. No      Feeling full is important to me. No      I finish all the food on my plate-even if I am already full. No      I eat/snack without noticing that I am eating. No     Returns for GVF+ffERG same day as return to Retina.  Last eye exam w/Dr. Bran 01-11-21:  cone dystrophy vs achromatopsia associated with reduced vision, photosensitivity, abnormal color vision, nystagmus and paradoxical pupil.  +Twin brother w/similar findings.  S/P Arian/Yunier w/great cosmesis.  Mild congenital ptosis, bilateral.  Accompanied by Mom today.     "  I eat when I am preparing the meal. No      I have trouble not eating junk if they are around the house. Yes      I think about food all day. No      Who does the grocery shopping? Pt   Who does the cooking? Pt         Eating Disorder Screen     I binge eat No            Activity/Exercise History     What do you do for exercise? Walks farther away,  Walk around [employer]   For how long to your exercise for?     What keeps you from being more active? Grief       Other: Pt present today with granddaughter. Struggles most with large portions of sweets, starch and sugary beverages; moreso since death of son. Will be starting new job at [employer]; appropriate questions about menu items and portions today. Pt to start Ozempic. Teen granddaughter occasionally participating in visit with appropriate questions about family involvement and limiting patient's intake of sweets and soda.     ANTHROPOMETRICS:  Height: 5' 5.5\"   Weight: 220 lbs 9.6 oz   BMI:  36.15 kg/m2  NUTRITION DIAGNOSIS:   Obese, class II related to excess energy intake as evidence by BMI of 36.15 kg/m2.   NUTRITION INTERVENTIONS  Nutrition Prescription:  Recommend modified energy- nutrient intake  Implementation:  Nutrition Education (Content):    Discussed portion sizes and plate method    Provided handouts: Tips for Weight Loss and Weight Management, Protein Sources for Weight Loss, Fiber Content in Food, Plate Method    Nutrition Education (Application):     Patient to practice goals as stated below    Patient verbalizes understanding of diet by stating she will eat 3 meals/day.    Anticipate good compliance      Goals:  Eat meals off a smaller plate  Eat 3 meals per day/avoid skipping meals    FOLLOW UP AND MONITORING:    Other  - follow up in 4-8 weeks.     TIME SPENT WITH PATIENT:   35 minutes       Janina Boggs RD, LD  Clinical Dietitian   "

## 2023-04-28 DIAGNOSIS — H35.53 CONE DYSTROPHY: Primary | ICD-10-CM

## 2023-05-15 ENCOUNTER — ALLIED HEALTH/NURSE VISIT (OUTPATIENT)
Dept: OPHTHALMOLOGY | Facility: CLINIC | Age: 23
End: 2023-05-15
Attending: OPHTHALMOLOGY
Payer: COMMERCIAL

## 2023-05-15 DIAGNOSIS — H35.53 CONE DYSTROPHY: ICD-10-CM

## 2023-05-15 DIAGNOSIS — H35.53 CONE DYSTROPHY: Primary | ICD-10-CM

## 2023-05-15 PROCEDURE — G0463 HOSPITAL OUTPT CLINIC VISIT: HCPCS | Performed by: OPHTHALMOLOGY

## 2023-05-15 PROCEDURE — 99214 OFFICE O/P EST MOD 30 MIN: CPT | Performed by: OPHTHALMOLOGY

## 2023-05-15 PROCEDURE — 92015 DETERMINE REFRACTIVE STATE: CPT

## 2023-05-15 PROCEDURE — 92250 FUNDUS PHOTOGRAPHY W/I&R: CPT | Performed by: OPHTHALMOLOGY

## 2023-05-15 PROCEDURE — 99207 FUNDUS PHOTOS OU (BOTH EYES): CPT | Mod: 26 | Performed by: OPHTHALMOLOGY

## 2023-05-15 PROCEDURE — 99207 PR NO CHARGE COORDINATED CARE PS: CPT | Performed by: OPHTHALMOLOGY

## 2023-05-15 PROCEDURE — 92134 CPTRZ OPH DX IMG PST SGM RTA: CPT | Performed by: OPHTHALMOLOGY

## 2023-05-15 PROCEDURE — 999N000103 HC STATISTIC NO CHARGE FACILITY FEE

## 2023-05-15 RX ORDER — LORATADINE 10 MG/1
10 TABLET ORAL DAILY
COMMUNITY

## 2023-05-15 ASSESSMENT — VISUAL ACUITY
OS_PH_CC: 20/125-
METHOD: SNELLEN - LINEAR
CORRECTION_TYPE: GLASSES
OD_CC: 20/80-/+
OS_CC: 20/150

## 2023-05-15 ASSESSMENT — REFRACTION_WEARINGRX
OS_AXIS: 073
OS_SPHERE: +2.25
OS_ADD: +5.00
OD_ADD: +5.00
OD_SPHERE: +1.50
OS_CYLINDER: +2.50
SPECS_TYPE: BIFOCAL

## 2023-05-15 ASSESSMENT — REFRACTION_MANIFEST
OD_CYLINDER: +1.75
OS_AXIS: 085
OD_AXIS: 139
OD_SPHERE: -1.25
OD_ADD: +3.50
OS_SPHERE: +1.50
OS_CYLINDER: +2.50
OS_ADD: +3.50

## 2023-05-15 ASSESSMENT — CONF VISUAL FIELD
OS_INFERIOR_TEMPORAL_RESTRICTION: 0
OD_SUPERIOR_TEMPORAL_RESTRICTION: 0
OS_SUPERIOR_TEMPORAL_RESTRICTION: 0
OD_NORMAL: 1
OS_SUPERIOR_NASAL_RESTRICTION: 0
OS_INFERIOR_NASAL_RESTRICTION: 0
OD_SUPERIOR_NASAL_RESTRICTION: 0
OD_INFERIOR_NASAL_RESTRICTION: 0
OD_INFERIOR_TEMPORAL_RESTRICTION: 0
OS_NORMAL: 1

## 2023-05-15 ASSESSMENT — CUP TO DISC RATIO
OS_RATIO: 0.6
OD_RATIO: 0.3

## 2023-05-15 ASSESSMENT — SLIT LAMP EXAM - LIDS
COMMENTS: NORMAL
COMMENTS: NORMAL

## 2023-05-15 ASSESSMENT — TONOMETRY
OS_IOP_MMHG: 13
OD_IOP_MMHG: 12
IOP_METHOD: TONOPEN

## 2023-05-15 NOTE — PROGRESS NOTES
CC: retina dystrophy follow up     HPI: Stanislav Calderon is a  22 year old year-old patient initially referred by Dr. Enciso to rule out cone dystrophy vs achromatopsia.  Patient has history of light sensitivity/photophobia.  Negative for glare and eye pain.  Treatments tried include glasses.  Reports Vision stable with prescription for the past several yrs. Sits near front of classroom, has lights dimmed, teacher uses specific colors on white board and says what he is writing. Photophobia controlled with transition lenses.  FH: twin brother with similar eye problems    Retinal Imaging:  OCT 05/15/23   RE: slightly decreased foval contour and stippling of the IS/OS Junction  LE:  slightly decreased foval contour and stippling of the IS/OS Junction    Optos pic consistent with exam 05/15/23   Autofluorescence:  Central foveal hyperautofluorescence; no bull's eye maculopathy      Assessment & Plan      Stanislav Calderon is a 22 year old male who presents with:     # Achromatopsia.   - invitae: Two heterozygous pathogenic variants were detected in the CNGB3 gene.   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  - paradoxical pupillary with constriction in dark  - recommend observation  - no gene therapy available yet. Patient can get additional information at clinicaltrials.gov    # Monocular esotropia - Left Eye  s/p Arian with Dr. Faye for anomalous head posture.  Small angle esotropia with great cosmesis.   - Monitor by Dr. Enciso.    # Low vision, both eyes  R:  20/80+1; L:20/125+1 best corrected visual acuity   - Continue with Transitions lenses/cap.  05/15/23 tint evaluation:   The patient chooses the NOIR #93 Dark Red while looking out the window.  The NOIR #90 Red worked well indoors and in less light situations.    He notes that he can  actually look out the window with the #99 and the #93 tint.    He also likes the Magenta #70.   The patient likes NOIR #99 Infrared protection glasses in 4%RED as well.  This is especially made for Achromats.   The patient likes the Black fit-over frame #36.  The patient prefers the Red tinted lenses over the grey tinted lenses.     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 Will monitor.    #Hyperopic astigmatism left eye   - Updated glasses prescription provided last eye examination. Likes high add for near.  - happy with the current prescription     Plan: - follow up in 1-2 yrs with prescription optos and Optical Coherence Tomography     ~~~~~~~~~~~~~~~~~~~~~~~~~~~~~~~~~~   Complete documentation of historical and exam elements from today's encounter can be found in the full encounter summary report (not reduplicated in this progress note).  I personally obtained the chief complaint(s) and history of present illness.  I confirmed and edited as necessary the review of systems, past medical/surgical history, family history, social history, and examination findings as documented by others; and I examined the patient myself.  I personally reviewed the relevant tests, images, and reports as documented above.  I formulated and edited as necessary the assessment and plan and discussed the findings and management plan with the patient and family    Molly Bran MD   of Ophthalmology.  Retina Service   Department of Ophthalmology and Visual Neurosciences   [facility]  Phone: [phone]   Fax: [phone]

## 2023-05-24 ENCOUNTER — TELEPHONE (OUTPATIENT)
Dept: OPHTHALMOLOGY | Facility: CLINIC | Age: 23
End: 2023-05-24
Payer: COMMERCIAL

## 2023-05-24 NOTE — TELEPHONE ENCOUNTER
Reviewed by Dr. Bran    Left message at 1005    Reviewed ok to to try Rx that was checked by two technicians.    Asked to review 90 day return policy with optical if not working well for doctors change.    Reviewed possible scheduling with Dr. Dalton or Dr. Maharaj for low vision refraction also in future if needed to work with reading power and working distance.    Direct number provided for any further questions    Abraham Ramos RN 10:12 AM 05/25/23            [facility]    Phone Message    May a detailed message be left on voicemail: yes     Reason for Call: Other: Jessica states the patient's Add power was 5 for the past 4 years, it is written as 3.50.  She would like to clarify that.  Please call.  Thank you.     Action Taken: Message routed to:  [facility] Ophthalmology    Travel Screening: Not Applicable

## 2023-06-01 ENCOUNTER — TELEPHONE (OUTPATIENT)
Dept: OPHTHALMOLOGY | Facility: CLINIC | Age: 23
End: 2023-06-01

## 2023-06-01 NOTE — TELEPHONE ENCOUNTER
[facility] Call [facility]    Phone Message    May a detailed message be left on voicemail: yes     Reason for Call: Other: Pt would like to have his eyeglass Rx sent to him [place]  [address] [place] [address]. Please review and contact pt with any questions    Thank you     Action Taken: Message routed to:  [facility] eye    Travel Screening: Not Applicable

## 2023-07-07 ENCOUNTER — TELEPHONE (OUTPATIENT)
Dept: OPHTHALMOLOGY | Facility: CLINIC | Age: 23
End: 2023-07-07
Payer: COMMERCIAL

## 2023-07-07 NOTE — TELEPHONE ENCOUNTER
Note to patient communicator to offer tech visit to recheck glasses    Abraham Ramos RN 1:08 PM 07/07/23           [facility]    Phone Message    May a detailed message be left on voicemail: yes     Reason for Call: Other: pt got his new glasses Rx and he said that there is something off about them and that he can't read with the new ones. Everything is blurry except for one specific spot. Please review and contact pt to discuss     Thank you      Action Taken: Other: eye    Travel Screening: Not Applicable

## 2023-07-11 ENCOUNTER — TELEPHONE (OUTPATIENT)
Dept: OPHTHALMOLOGY | Facility: CLINIC | Age: 23
End: 2023-07-11
Payer: COMMERCIAL

## 2023-07-11 NOTE — TELEPHONE ENCOUNTER
Left message at 1634 and reviewed will be reaching out tomorrow after review with technician of likely repeat tech visit for glasses recheck.    Abraham Ramos RN 4:35 PM 07/11/23              [facility]    Phone Message    May a detailed message be left on voicemail: yes     Reason for Call: Other: Pt states the new glasses rx fixed the stigmatism but noticed that everything else looks blurry. Pt would like to know if theres a way to get the rx fixed. Pt is hoping to get the prescription can be corrected before 7/31 due to insurance ending on that date. Please reach out to pt and advise. Thank You!     Action Taken: Message routed to:  [facility] eye    Travel Screening: Not Applicable

## 2023-07-12 ENCOUNTER — TELEPHONE (OUTPATIENT)
Dept: OPHTHALMOLOGY | Facility: CLINIC | Age: 23
End: 2023-07-12
Payer: COMMERCIAL

## 2023-07-12 NOTE — TELEPHONE ENCOUNTER
[facility] Call Center    Phone Message    May a detailed message be left on voicemail: yes     Reason for Call: Appointment Intake    Referring Provider Name: Dr. Bran  Diagnosis and/or Symptoms: Follow up tech appt. Writer unable to schedule. Please call pt. Thank you.     Action Taken: Message routed to:  [facility] EYE    Travel Screening: Not Applicable

## 2023-07-12 NOTE — TELEPHONE ENCOUNTER
Stanislav is not able to do Friday - canceled appointment I made -     His phone dropped     I rescheduled for tues 7/18    Shannon Mendiola Communication Facilitator on 7/12/2023 at 3:31 PM

## 2023-07-12 NOTE — TELEPHONE ENCOUNTER
Ok for this Friday at 1130 for tech visit per my review     Note to patient communicator to offer appt    Abraham Ramos RN 3:18 PM 07/12/23

## 2023-07-18 ENCOUNTER — ALLIED HEALTH/NURSE VISIT (OUTPATIENT)
Dept: OPHTHALMOLOGY | Facility: CLINIC | Age: 23
End: 2023-07-18
Attending: OPHTHALMOLOGY
Payer: COMMERCIAL

## 2023-07-18 DIAGNOSIS — H52.7 REFRACTIVE ERROR: Primary | ICD-10-CM

## 2023-07-18 PROCEDURE — 99207 PR NO CHARGE COORDINATED CARE PS: CPT | Performed by: OPHTHALMOLOGY

## 2023-07-18 PROCEDURE — 999N000103 HC STATISTIC NO CHARGE FACILITY FEE

## 2023-07-18 ASSESSMENT — VISUAL ACUITY
OS_CC+: -1
OD_CC: 20/70
METHOD: SNELLEN - LINEAR
CORRECTION_TYPE: GLASSES
OD_CC+: -2
OS_CC: 20/100

## 2023-07-18 ASSESSMENT — REFRACTION_MANIFEST
OS_ADD: +5.00
OD_AXIS: 140
OS_CYLINDER: +2.50
OS_AXIS: 090
OS_SPHERE: +1.25
OD_SPHERE: -0.50
OD_CYLINDER: +0.50
OD_ADD: +5.00

## 2023-07-18 ASSESSMENT — REFRACTION_WEARINGRX
SPECS_TYPE: BIFOCAL
OD_ADD: +3.50
OS_SPHERE: +1.50
OS_AXIS: 086
OS_ADD: +3.50
OD_SPHERE: -1.25
OS_CYLINDER: +2.50
OD_AXIS: 138
OD_CYLINDER: +1.75

## 2023-07-19 NOTE — PROGRESS NOTES
~~~~~~~~~~~~~~~~~~~~~~~~~~~~~~~~~~  I have not examined this patient.  I have reviewed the documentation above and agree with the assessment and plan as stated above and agree with all of its relevant components.    Molly Bran MD  Professor of Ophthalmology  Vitreo-Retinal surgeon   Department of Ophthalmology and Visual Neurosciences   [facility]  Phone: [phone]   Fax: [phone]

## 2025-01-04 ENCOUNTER — HEALTH MAINTENANCE LETTER (OUTPATIENT)
Age: 25
End: 2025-01-04

## 2025-06-02 DIAGNOSIS — H35.53 CONE DYSTROPHY: Primary | ICD-10-CM

## 2025-06-11 ENCOUNTER — OFFICE VISIT (OUTPATIENT)
Dept: OPHTHALMOLOGY | Facility: CLINIC | Age: 25
End: 2025-06-11
Attending: OPHTHALMOLOGY
Payer: COMMERCIAL

## 2025-06-11 DIAGNOSIS — H35.53 CONE DYSTROPHY: ICD-10-CM

## 2025-06-11 PROCEDURE — 92134 CPTRZ OPH DX IMG PST SGM RTA: CPT | Performed by: OPHTHALMOLOGY

## 2025-06-11 PROCEDURE — 92250 FUNDUS PHOTOGRAPHY W/I&R: CPT | Performed by: OPHTHALMOLOGY

## 2025-06-11 PROCEDURE — 99214 OFFICE O/P EST MOD 30 MIN: CPT | Performed by: OPHTHALMOLOGY

## 2025-06-11 ASSESSMENT — CONF VISUAL FIELD
OS_NORMAL: 1
OD_INFERIOR_TEMPORAL_RESTRICTION: 0
OS_SUPERIOR_NASAL_RESTRICTION: 0
METHOD: COUNTING FINGERS
OD_INFERIOR_NASAL_RESTRICTION: 0
OS_SUPERIOR_TEMPORAL_RESTRICTION: 0
OD_SUPERIOR_NASAL_RESTRICTION: 0
OS_INFERIOR_TEMPORAL_RESTRICTION: 0
OD_NORMAL: 1
OS_INFERIOR_NASAL_RESTRICTION: 0
OD_SUPERIOR_TEMPORAL_RESTRICTION: 0

## 2025-06-11 ASSESSMENT — REFRACTION_MANIFEST
OS_CYLINDER: +2.75
OS_ADD: +1.25
OD_ADD: +1.25
OD_AXIS: 135
OD_SPHERE: -0.50
OS_SPHERE: +1.25
OS_AXIS: 090
OD_CYLINDER: +0.50

## 2025-06-11 ASSESSMENT — REFRACTION_WEARINGRX
OS_CYLINDER: +2.50
OS_AXIS: 090
OD_ADD: +5.00
OD_SPHERE: -0.50
OS_SPHERE: +1.25
OS_ADD: +5.00
OD_CYLINDER: +0.50
OD_AXIS: 140

## 2025-06-11 ASSESSMENT — TONOMETRY
OS_IOP_MMHG: 16
OD_IOP_MMHG: 16
IOP_METHOD: TONOPEN

## 2025-06-11 ASSESSMENT — VISUAL ACUITY
METHOD: SNELLEN - LINEAR
OD_CC+: +1
CORRECTION_TYPE: GLASSES
OS_CC+: +1
OD_CC: 20/80
OS_CC: 20/125

## 2025-06-11 ASSESSMENT — CUP TO DISC RATIO
OS_RATIO: 0.55
OD_RATIO: 0.3

## 2025-06-11 ASSESSMENT — SLIT LAMP EXAM - LIDS
COMMENTS: NORMAL
COMMENTS: NORMAL

## 2025-06-11 NOTE — PROGRESS NOTES
CC: retina dystrophy follow up     HPI: Stanislav Calderon is a  24- year old year-old patient initially referred by Dr. Enciso to rule out cone dystrophy vs achromatopsia.  Patient has history of light sensitivity/photophobia.  Negative for glare and eye pain.  Treatments tried include glasses.  Reports Vision stable with prescription for the past several yrs. Sits near front of classroom, has lights dimmed, teacher uses specific colors on white board and says what he is writing. Photophobia controlled with transition lenses.  FH: twin brother with similar eye problems    Using transition prescription and likes it    Retinal Imaging:  OCT 05/15/23   RE: slightly decreased foval contour and stippling of the IS/OS Junction  LE:  slightly decreased foval contour and stippling of the IS/OS Junction    Optos pic consistent with exam 05/15/23   Autofluorescence:  Central foveal hyperautofluorescence; no bull's eye maculopathy      Assessment & Plan      Stanislav Calderon is a 24 year old male who presents with:     # Achromatopsia.   - invitae: Two heterozygous pathogenic variants were detected in the CNGB3 gene.   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  Small angle esotropia with great cosmesis.   - Monitor by Dr. Enciso.    # Low vision, both eyes  R:  20/80+1; L:20/125+1 best corrected visual acuity   - Continue with Transitions lenses/cap.  05/15/23 tint evaluation:   The patient chooses the NOIR #93 Dark Red while looking out the window.  The NOIR #90 Red worked well indoors and in less light situations.    He notes  that he can actually look out the window with the #99 and the #93 tint.    He also likes the Magenta #70.   The patient likes NOIR #99 Infrared protection glasses in 4%RED as well.  This is especially made for Achromats.   The patient likes the Black fit-over frame #36.  The patient prefers the Red tinted lenses over the grey tinted lenses.     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 Will monitor.    #Hyperopic astigmatism left eye   - Updated glasses prescription provided last eye examination. Likes high add for near.  - happy with the current prescription     # asymmetric cups  Intraocular pressure 16   observe    Plan: - follow up in 1 yrs with optos and Optical Coherence Tomography and retinal nerve fiber layer     ~~~~~~~~~~~~~~~~~~~~~~~~~~~~~~~~~~   Complete documentation of historical and exam elements from today's encounter can be found in the full encounter summary report (not reduplicated in this progress note).  I personally obtained the chief complaint(s) and history of present illness.  I confirmed and edited as necessary the review of systems, past medical/surgical history, family history, social history, and examination findings as documented by others; and I examined the patient myself.  I personally reviewed the relevant tests, images, and reports as documented above.  I formulated and edited as necessary the assessment and plan and discussed the findings and management plan with the patient and family    Molly Bran MD   of Ophthalmology.  Retina Service   Department of Ophthalmology and Visual Neurosciences   [facility]  Phone: [phone]   Fax: [phone]

## 2025-06-11 NOTE — NURSING NOTE
Chief Complaints and History of Present Illnesses   Patient presents with    Follow Up      Achromatopsia.      Chief Complaint(s) and History of Present Illness(es)       Follow Up              Comments:  Achromatopsia.               Comments    Pt states no change in [place] since last visit  No flashes or floaters   No eye pain or redness    Zainab Sylvester COT 8:49 AM June 11, 2025